# Patient Record
Sex: FEMALE | Race: WHITE | NOT HISPANIC OR LATINO | Employment: PART TIME | URBAN - METROPOLITAN AREA
[De-identification: names, ages, dates, MRNs, and addresses within clinical notes are randomized per-mention and may not be internally consistent; named-entity substitution may affect disease eponyms.]

---

## 2018-03-30 ENCOUNTER — HOSPITAL ENCOUNTER (EMERGENCY)
Facility: HOSPITAL | Age: 63
Discharge: HOME | End: 2018-03-30
Payer: COMMERCIAL

## 2018-03-30 VITALS
WEIGHT: 130 LBS | DIASTOLIC BLOOD PRESSURE: 71 MMHG | RESPIRATION RATE: 20 BRPM | BODY MASS INDEX: 24.55 KG/M2 | HEIGHT: 61 IN | HEART RATE: 93 BPM | TEMPERATURE: 98 F | OXYGEN SATURATION: 96 % | SYSTOLIC BLOOD PRESSURE: 160 MMHG

## 2018-03-30 DIAGNOSIS — S29.011A MUSCLE STRAIN OF CHEST WALL, INITIAL ENCOUNTER: Primary | ICD-10-CM

## 2018-03-30 DIAGNOSIS — R07.81 RIB PAIN ON LEFT SIDE: ICD-10-CM

## 2018-03-30 PROCEDURE — 99283 EMERGENCY DEPT VISIT LOW MDM: CPT

## 2018-03-31 NOTE — ED PROVIDER NOTES
"SCRIBE #1 NOTE: I, Corinne Mack, am scribing for, and in the presence of, Marlene Gonsalez PA-C. I have scribed the entire note.      History      Chief Complaint   Patient presents with    Muscle Pain     patient lifted on battery and felt something pull in abdomen       Review of patient's allergies indicates:   Allergen Reactions    Bactrim [sulfamethoxazole-trimethoprim]         HPI   HPI    3/30/2018, 7:21 PM   History obtained from the patient      History of Present Illness: Fe Betancourt is a 62 y.o. female patient who presents to the Emergency Department for L sided abd wall pain which onset suddenly 2 weeks ago. Pt was lifting "boxes of batteries" when she felt a sharp, sudden pain near her ribs that has not improved since then. Symptoms are constant and moderate in severity. Palpation worsens the pt's pain. No mitigating factors reported. Patient denies any CP, SOB, emesis, back pain, neck pain, dizziness, and all other sxs at this time. No prior Tx reported. No further complaints or concerns at this time.         Arrival mode: Personal vehicle    PCP: Otf So MD       Past Medical History:  Past Medical History:   Diagnosis Date    Broken bones        Past Surgical History:  Past Surgical History:   Procedure Laterality Date    CHOLECYSTECTOMY      HYSTERECTOMY      TONSILLECTOMY           Family History:  No family history on file.    Social History:  Social History     Social History Main Topics    Smoking status: Current Every Day Smoker     Packs/day: 1.00    Smokeless tobacco: Not on file    Alcohol use No    Drug use: No    Sexual activity: Not on file       ROS   Review of Systems   Constitutional: Negative for chills and fever.   Respiratory: Negative for cough and shortness of breath.    Cardiovascular: Negative for chest pain and leg swelling.   Gastrointestinal: Negative for abdominal pain, diarrhea and vomiting.   Musculoskeletal: Positive for myalgias (abd " "wall/rib pain). Negative for back pain, neck pain and neck stiffness.   Skin: Negative for rash and wound.   Neurological: Negative for dizziness, light-headedness, numbness and headaches.   All other systems reviewed and are negative.    Physical Exam      Initial Vitals [03/30/18 1901]   BP Pulse Resp Temp SpO2   (!) 160/71 93 20 98.4 °F (36.9 °C) 96 %      MAP       100.67          Physical Exam  Nursing Notes and Vital Signs Reviewed.  Constitutional: Patient is in no apparent distress. Well-developed and well-nourished.  Head: Atraumatic. Normocephalic.  Eyes: PERRL. EOM intact. Conjunctivae are not pale. No scleral icterus.  ENT: Mucous membranes are moist. Oropharynx is clear and symmetric.    Neck: Supple. Full ROM. No lymphadenopathy.  Cardiovascular: Regular rate. Regular rhythm. No murmurs, rubs, or gallops. Distal pulses are 2+ and symmetric.  Pulmonary/Chest: No respiratory distress. Clear to auscultation bilaterally. No wheezing or rales.  Abdominal: Soft and non-distended.  There is no tenderness.  No rebound, guarding, or rigidity.   Musculoskeletal: Moves all extremities. No obvious deformities. No edema. No calf tenderness. L lower rib tenderness to palpation.  Skin: Warm and dry.  Neurological:  Alert, awake, and appropriate.  Normal speech.  No acute focal neurological deficits are appreciated.  Psychiatric: Normal affect. Good eye contact. Appropriate in content.    ED Course    Procedures  ED Vital Signs:  Vitals:    03/30/18 1901   BP: (!) 160/71   Pulse: 93   Resp: 20   Temp: 98.4 °F (36.9 °C)   TempSrc: Oral   SpO2: 96%   Weight: 59 kg (130 lb)   Height: 5' 1" (1.549 m)         Imaging Results:  Imaging Results          X-Ray Ribs 2 View Left (Final result)  Result time 03/30/18 20:16:43    Final result by Donnie Sales MD (03/30/18 20:16:43)                 Impression:      No acute findings      Electronically signed by: DONNIE SALES MD  Date:     03/30/18  Time:    20:16           "    Narrative:    Exam: XR RIBS 2 VIEW LEFT,    Date:  03/30/18 20:02:22    History: Pleurodynia    Comparison:  No prior relevant studies available    Findings: Large normal in size.  Lungs are clear.  The left ribs appear intact.                                      The Emergency Provider reviewed the vital signs and test results, which are outlined above.    ED Discussion     8:34 PM: Reassessed pt at this time. Pt is awake, alert, and in no distress. Discussed with pt all pertinent ED information and results. Discussed pt dx and plan of tx. Gave pt all f/u and return to the ED instructions. All questions and concerns were addressed at this time. Pt expresses understanding of information and instructions, and is comfortable with plan to discharge. Pt is stable for discharge.    I discussed with patient and/or family/caretaker that evaluation in the ED does not suggest any emergent or life threatening medical conditions requiring immediate intervention beyond what was provided in the ED, and I believe patient is safe for discharge.  Regardless, an unremarkable evaluation in the ED does not preclude the development or presence of a serious of life threatening condition. As such, patient was instructed to return immediately for any worsening or change in current symptoms.      ED Medication(s):  Medications - No data to display    Discharge Medication List as of 3/30/2018  8:40 PM          Follow-up Information     Schedule an appointment as soon as possible for a visit  with Natalia Juárez MD.    Specialty:  Internal Medicine  Contact information:  [address] [address]  [phone]             [facility]    Specialty:  Emergency Medicine  Why:  If symptoms worsen  Contact information:  [address] [facility] [address] [phone]                   Medical Decision Making    Medical Decision Making:   Clinical Tests:   Radiological Study: Ordered  and Reviewed           Scribe Attestation:   Scribe #1: I performed the above scribed service and the documentation accurately describes the services I performed. I attest to the accuracy of the note.    Attending:   Physician Attestation Statement for Scribe #1: I, Marlene Gonsalez PA-C, personally performed the services described in this documentation, as scribed by Corinne Mack, in my presence, and it is both accurate and complete.          Clinical Impression       ICD-10-CM ICD-9-CM   1. Muscle strain of chest wall, initial encounter S29.011A 848.8   2. Rib pain on left side R07.81 786.50       Disposition:   Disposition: Discharged  Condition: Stable           Marlene Gonsalez PA-C  03/31/18 0003

## 2019-08-17 ENCOUNTER — HOSPITAL ENCOUNTER (EMERGENCY)
Facility: HOSPITAL | Age: 64
Discharge: HOME | End: 2019-08-17
Attending: EMERGENCY MEDICINE

## 2019-08-17 VITALS
DIASTOLIC BLOOD PRESSURE: 74 MMHG | OXYGEN SATURATION: 97 % | WEIGHT: 116 LBS | HEART RATE: 100 BPM | HEIGHT: 60 IN | SYSTOLIC BLOOD PRESSURE: 145 MMHG | BODY MASS INDEX: 22.78 KG/M2 | TEMPERATURE: 98 F | RESPIRATION RATE: 20 BRPM

## 2019-08-17 DIAGNOSIS — S93.401A SPRAIN OF RIGHT ANKLE, UNSPECIFIED LIGAMENT, INITIAL ENCOUNTER: Primary | ICD-10-CM

## 2019-08-17 DIAGNOSIS — M25.571 RIGHT ANKLE PAIN: ICD-10-CM

## 2019-08-17 DIAGNOSIS — M79.671 RIGHT FOOT PAIN: ICD-10-CM

## 2019-08-17 LAB
HCV AB SERPL QL IA: NEGATIVE
HIV 1+2 AB+HIV1 P24 AG SERPL QL IA: NEGATIVE

## 2019-08-17 PROCEDURE — 29515 APPLICATION SHORT LEG SPLINT: CPT | Mod: RT

## 2019-08-17 PROCEDURE — 86803 HEPATITIS C AB TEST: CPT

## 2019-08-17 PROCEDURE — 99284 EMERGENCY DEPT VISIT MOD MDM: CPT | Mod: 25

## 2019-08-17 PROCEDURE — 86703 HIV-1/HIV-2 1 RESULT ANTBDY: CPT

## 2019-08-18 NOTE — ED PROVIDER NOTES
SCRIBE #1 NOTE: I, Chiquis Olivas, am scribing for, and in the presence of, Vadim Archer MD. I have scribed the entire note.      History      Chief Complaint   Patient presents with    Foot Injury     pt reports falling off step ladder, causing right ankle/foot pain       Review of patient's allergies indicates:   Allergen Reactions    Bactrim [sulfamethoxazole-trimethoprim]         HPI   HPI    8/17/2019, 7:27 PM   History obtained from the patient      History of Present Illness: Fe Saenz is a 63 y.o. female patient who presents to the Emergency Department for evaluation of R foot injury which onset this morning after she lost her balance when getting down a step ladder. Symptoms are constant and moderate in severity. Patient reports hitting her R heel when she fell, causing pain to her ankle and heel. No mitigating or exacerbating factors reported. No associated sxs. Patient denies any N/V, head trauma/injury, LOC, extremity numbness/weakness, abrasions, and all other sxs at this time. No prior Tx. No further complaints or concerns at this time.         Arrival mode: Personal vehicle     PCP: Otf So MD       Past Medical History:  Past Medical History:   Diagnosis Date    Broken bones        Past Surgical History:  Past Surgical History:   Procedure Laterality Date    CHOLECYSTECTOMY      HYSTERECTOMY      TONSILLECTOMY       Family History:  Family history reviewed not relevant      Social History:  Social History     Tobacco Use    Smoking status: Current Every Day Smoker     Packs/day: 1.00   Substance and Sexual Activity    Alcohol use: No    Drug use: No    Sexual activity: Unknown       ROS   Review of Systems   Constitutional: Negative for fever.   HENT: Negative for sore throat.    Respiratory: Negative for shortness of breath.    Cardiovascular: Negative for chest pain.   Gastrointestinal: Negative for nausea and vomiting.   Genitourinary: Negative for dysuria.    Musculoskeletal: Negative for back pain.        (+) R foot pain (ankle + heel)   Skin: Negative for rash and wound.   Neurological: Negative for syncope, weakness and numbness.   Hematological: Does not bruise/bleed easily.   All other systems reviewed and are negative.    Physical Exam      Initial Vitals [08/17/19 1906]   BP Pulse Resp Temp SpO2   (!) 145/74 100 20 97.9 °F (36.6 °C) 97 %      MAP       --          Physical Exam  Nursing Notes and Vital Signs Reviewed.  Constitutional: Patient is in no acute distress. Well-developed and well-nourished.  Head: Atraumatic. Normocephalic.  Eyes: PERRL. EOM intact. Conjunctivae are not pale. No scleral icterus.  ENT: Mucous membranes are moist.   Neck: Supple. Full ROM. No lymphadenopathy.  Cardiovascular: Regular rate. Regular rhythm. No murmurs, rubs, or gallops. Distal pulses are 2+ and symmetric.  Pulmonary/Chest: No respiratory distress. Clear to auscultation bilaterally. No wheezing or rales.  Abdominal: Soft and non-distended.  There is no tenderness.  No rebound, guarding, or rigidity.  Genitourinary: No CVA tenderness.  Musculoskeletal: Moves all extremities. No obvious deformities. No edema. No calf tenderness.  Right Ankle:  No obvious deformity. There is swelling to the lateral malleolus with ecchymosis.  There is tenderness to bilateral malleoli.  She is not able to bear weight.   Ankle dorsiflexion and ankle plantar flexion are intact.  Intact sensation to light touch. Distal capillary refill takes less than 2 seconds.  PT and DP pulses are 2+ bilaterally.  Skin: Warm and dry.  Neurological:  Alert, awake, and appropriate.  Normal speech.  No acute focal neurological deficits are appreciated.  Psychiatric: Normal affect. Good eye contact. Appropriate in content.    ED Course    ED Vital Signs:  Vitals:    08/17/19 1906   BP: (!) 145/74   Pulse: 100   Resp: 20   Temp: 97.9 °F (36.6 °C)   TempSrc: Oral   SpO2: 97%   Weight: 52.6 kg (116 lb)   Height: 5'  (1.524 m)       Abnormal Lab Results:  Labs Reviewed   HIV 1 / 2 ANTIBODY   HEPATITIS C ANTIBODY        All Lab Results:  None    Imaging Results:  Imaging Results          X-Ray Foot Complete Right (Final result)  Result time 08/17/19 20:04:18    Final result by Byran Khan MD (Timothy) (08/17/19 20:04:18)                 Impression:      Negative exam.      Electronically signed by: Bryan Khan MD  Date:    08/17/2019  Time:    20:04             Narrative:    EXAMINATION:  XR FOOT COMPLETE 3 VIEW RIGHT    CLINICAL HISTORY:  Pain in right foot    TECHNIQUE:  Standard radiography performed.    COMPARISON:  None    FINDINGS:  Bone density and architecture are normal.  No acute findings.                               X-Ray Ankle Complete Right (Final result)  Result time 08/17/19 20:03:15    Final result by Bryan Khan MD (Timothy) (08/17/19 20:03:15)                 Impression:      Negative exam.      Electronically signed by: Bryan Khan MD  Date:    08/17/2019  Time:    20:03             Narrative:    EXAMINATION:  XR ANKLE COMPLETE 3 VIEW RIGHT    CLINICAL HISTORY:  Pain in right ankle and joints of right foot    TECHNIQUE:  Standard radiography performed.    COMPARISON:  None    FINDINGS:  Bone density and architecture are normal.  No acute findings.                                        The Emergency Provider reviewed the vital signs and test results, which are outlined above.    ED Discussion     8:23 PM: Patient was put in a walking boot at this time it was able to tolerate ambulation in walking boot.    8:25 PM: Reassessed pt at this time.  Pt states her condition has improved at this time. Discussed with pt all pertinent ED information and results. Discussed pt dx and plan of tx. Gave pt all f/u and return to the ED instructions. All questions and concerns were addressed at this time. Pt expresses understanding of information and instructions, and is comfortable with plan to discharge. Pt  is stable for discharge.    I discussed with patient and/or family/caretaker that evaluation in the ED does not suggest any emergent or life threatening medical conditions requiring immediate intervention beyond what was provided in the ED, and I believe patient is safe for discharge.  Regardless, an unremarkable evaluation in the ED does not preclude the development or presence of a serious of life threatening condition. As such, patient was instructed to return immediately for any worsening or change in current symptoms.      ED Medication(s):  Medications - No data to display     Current Discharge Medication List          Follow-up Information     Otf So MD In 1 week.    Specialties:  Physical Medicine and Rehabilitation, Physical Therapy  Contact information:  [address] [facility]  [facility] [address]  [phone]             [facility]    Specialty:  Emergency Medicine  Why:  As needed, If symptoms worsen  Contact information:  [address]  [facility] [address] [phone]                   Medical Decision Making    Medical Decision Making:   Clinical Tests:   Radiological Study: Ordered and Reviewed           Scribe Attestation:   Scribe #1: I performed the above scribed service and the documentation accurately describes the services I performed. I attest to the accuracy of the note.    Attending:   Physician Attestation Statement for Scribe #1: I, Vadim Archer MD, personally performed the services described in this documentation, as scribed by Chiquis Olivas, in my presence, and it is both accurate and complete.          Clinical Impression       ICD-10-CM ICD-9-CM   1. Sprain of right ankle, unspecified ligament, initial encounter S93.401A 845.00   2. Right ankle pain M25.571 719.47   3. Right foot pain M79.671 729.5       Disposition:   Disposition: Discharged  Condition: Stable         Vadim Archer MD  08/17/19  2051